# Patient Record
Sex: MALE | Race: OTHER | HISPANIC OR LATINO | Employment: FULL TIME | URBAN - METROPOLITAN AREA
[De-identification: names, ages, dates, MRNs, and addresses within clinical notes are randomized per-mention and may not be internally consistent; named-entity substitution may affect disease eponyms.]

---

## 2017-03-12 ENCOUNTER — HOSPITAL ENCOUNTER (EMERGENCY)
Facility: HOSPITAL | Age: 22
Discharge: HOME | End: 2017-03-12
Attending: EMERGENCY MEDICINE
Payer: COMMERCIAL

## 2017-03-12 ENCOUNTER — APPOINTMENT (EMERGENCY)
Dept: RADIOLOGY | Facility: HOSPITAL | Age: 22
End: 2017-03-12
Payer: COMMERCIAL

## 2017-03-12 VITALS
HEART RATE: 65 BPM | TEMPERATURE: 95.8 F | WEIGHT: 300 LBS | RESPIRATION RATE: 18 BRPM | DIASTOLIC BLOOD PRESSURE: 77 MMHG | SYSTOLIC BLOOD PRESSURE: 135 MMHG | OXYGEN SATURATION: 99 %

## 2017-03-12 DIAGNOSIS — S92.352A FRACTURE OF FIFTH METATARSAL BONE OF LEFT FOOT: Primary | ICD-10-CM

## 2017-03-12 PROCEDURE — 73630 X-RAY EXAM OF FOOT: CPT

## 2017-03-12 PROCEDURE — 99283 EMERGENCY DEPT VISIT LOW MDM: CPT

## 2017-03-12 RX ORDER — OXYCODONE HYDROCHLORIDE AND ACETAMINOPHEN 5; 325 MG/1; MG/1
1 TABLET ORAL EVERY 4 HOURS PRN
Qty: 20 TABLET | Refills: 0 | Status: SHIPPED | OUTPATIENT
Start: 2017-03-12 | End: 2017-03-19

## 2017-03-15 ENCOUNTER — ALLSCRIPTS OFFICE VISIT (OUTPATIENT)
Dept: OTHER | Facility: OTHER | Age: 22
End: 2017-03-15

## 2017-03-22 ENCOUNTER — HOSPITAL ENCOUNTER (OUTPATIENT)
Dept: RADIOLOGY | Facility: CLINIC | Age: 22
Discharge: HOME | End: 2017-03-22
Payer: COMMERCIAL

## 2017-03-22 ENCOUNTER — ALLSCRIPTS OFFICE VISIT (OUTPATIENT)
Dept: OTHER | Facility: OTHER | Age: 22
End: 2017-03-22

## 2017-03-22 DIAGNOSIS — S92.352A CLOSED DISPLACED FRACTURE OF FIFTH METATARSAL BONE OF LEFT FOOT: ICD-10-CM

## 2017-03-22 PROCEDURE — 73630 X-RAY EXAM OF FOOT: CPT

## 2017-04-12 ENCOUNTER — HOSPITAL ENCOUNTER (OUTPATIENT)
Dept: RADIOLOGY | Facility: CLINIC | Age: 22
Discharge: HOME | End: 2017-04-12
Payer: COMMERCIAL

## 2017-04-12 ENCOUNTER — ALLSCRIPTS OFFICE VISIT (OUTPATIENT)
Dept: OTHER | Facility: OTHER | Age: 22
End: 2017-04-12

## 2017-04-12 DIAGNOSIS — S92.352A CLOSED DISPLACED FRACTURE OF FIFTH METATARSAL BONE OF LEFT FOOT: ICD-10-CM

## 2017-04-12 PROCEDURE — 73630 X-RAY EXAM OF FOOT: CPT

## 2017-04-25 DIAGNOSIS — S92.352A CLOSED DISPLACED FRACTURE OF FIFTH METATARSAL BONE OF LEFT FOOT: ICD-10-CM

## 2017-05-30 DIAGNOSIS — S92.352A CLOSED DISPLACED FRACTURE OF FIFTH METATARSAL BONE OF LEFT FOOT: ICD-10-CM

## 2018-01-13 VITALS
SYSTOLIC BLOOD PRESSURE: 128 MMHG | DIASTOLIC BLOOD PRESSURE: 79 MMHG | BODY MASS INDEX: 39.76 KG/M2 | HEIGHT: 73 IN | WEIGHT: 300 LBS

## 2018-02-15 ENCOUNTER — APPOINTMENT (EMERGENCY)
Dept: RADIOLOGY | Facility: HOSPITAL | Age: 23
End: 2018-02-15
Payer: COMMERCIAL

## 2018-02-15 ENCOUNTER — HOSPITAL ENCOUNTER (EMERGENCY)
Facility: HOSPITAL | Age: 23
Discharge: HOME | End: 2018-02-15
Attending: EMERGENCY MEDICINE | Admitting: EMERGENCY MEDICINE
Payer: COMMERCIAL

## 2018-02-15 VITALS
OXYGEN SATURATION: 97 % | DIASTOLIC BLOOD PRESSURE: 71 MMHG | SYSTOLIC BLOOD PRESSURE: 155 MMHG | BODY MASS INDEX: 40.24 KG/M2 | WEIGHT: 305 LBS | TEMPERATURE: 98 F | HEART RATE: 92 BPM | RESPIRATION RATE: 18 BRPM

## 2018-02-15 DIAGNOSIS — K52.9 GASTROENTERITIS: Primary | ICD-10-CM

## 2018-02-15 LAB
ALBUMIN SERPL BCP-MCNC: 4.1 G/DL (ref 3.5–5)
ALP SERPL-CCNC: 82 U/L (ref 46–116)
ALT SERPL W P-5'-P-CCNC: 49 U/L (ref 12–78)
ANION GAP SERPL CALCULATED.3IONS-SCNC: 3 MMOL/L (ref 4–13)
APTT PPP: 26 SECONDS (ref 24–33)
AST SERPL W P-5'-P-CCNC: 24 U/L (ref 5–45)
BILIRUB SERPL-MCNC: 1 MG/DL (ref 0.2–1)
BUN SERPL-MCNC: 12 MG/DL (ref 5–25)
CALCIUM SERPL-MCNC: 8.8 MG/DL (ref 8.3–10.1)
CHLORIDE SERPL-SCNC: 100 MMOL/L (ref 100–108)
CO2 SERPL-SCNC: 34 MMOL/L (ref 21–32)
CREAT SERPL-MCNC: 1.1 MG/DL (ref 0.6–1.3)
ERYTHROCYTE [DISTWIDTH] IN BLOOD BY AUTOMATED COUNT: 13.1 % (ref 11.6–15.1)
GFR SERPL CREATININE-BSD FRML MDRD: 95 ML/MIN/1.73SQ M
GLUCOSE SERPL-MCNC: 107 MG/DL (ref 65–140)
HCT VFR BLD AUTO: 51 % (ref 42–52)
HGB BLD-MCNC: 17.1 G/DL (ref 14–18)
INR PPP: 0.99 (ref 0.86–1.16)
LG PLATELETS BLD QL SMEAR: PRESENT
LIPASE SERPL-CCNC: 86 U/L (ref 73–393)
LYMPHOCYTES # BLD AUTO: 0.31 THOUSAND/UL (ref 0.6–4.47)
LYMPHOCYTES # BLD AUTO: 3 %
MAGNESIUM SERPL-MCNC: 1.7 MG/DL (ref 1.6–2.6)
MCH RBC QN AUTO: 27.8 PG (ref 27–31)
MCHC RBC AUTO-ENTMCNC: 33.6 G/DL (ref 31.4–37.4)
MCV RBC AUTO: 83 FL (ref 82–98)
MONOCYTES # BLD AUTO: 0.62 THOUSAND/UL (ref 0–1.22)
MONOCYTES NFR BLD AUTO: 6 % (ref 4–12)
NEUTS BAND NFR BLD MANUAL: 15 % (ref 0–8)
NEUTS SEG # BLD: 9.46 THOUSAND/UL (ref 1.81–6.82)
NEUTS SEG NFR BLD AUTO: 76 %
NRBC BLD AUTO-RTO: 0 /100 WBCS
PLATELET # BLD AUTO: 177 THOUSANDS/UL (ref 130–400)
PLATELET BLD QL SMEAR: ADEQUATE
PMV BLD AUTO: 8.6 FL (ref 8.9–12.7)
POTASSIUM SERPL-SCNC: 4.1 MMOL/L (ref 3.5–5.3)
PROT SERPL-MCNC: 7.4 G/DL (ref 6.4–8.2)
PROTHROMBIN TIME: 10.4 SECONDS (ref 9.4–11.7)
RBC # BLD AUTO: 6.17 MILLION/UL (ref 4.7–6.1)
SODIUM SERPL-SCNC: 137 MMOL/L (ref 136–145)
TOTAL CELLS COUNTED SPEC: 100
WBC # BLD AUTO: 10.4 THOUSAND/UL (ref 4.8–10.8)

## 2018-02-15 PROCEDURE — C9113 INJ PANTOPRAZOLE SODIUM, VIA: HCPCS | Performed by: EMERGENCY MEDICINE

## 2018-02-15 PROCEDURE — 96374 THER/PROPH/DIAG INJ IV PUSH: CPT

## 2018-02-15 PROCEDURE — 83690 ASSAY OF LIPASE: CPT | Performed by: EMERGENCY MEDICINE

## 2018-02-15 PROCEDURE — 80053 COMPREHEN METABOLIC PANEL: CPT | Performed by: EMERGENCY MEDICINE

## 2018-02-15 PROCEDURE — 85610 PROTHROMBIN TIME: CPT | Performed by: EMERGENCY MEDICINE

## 2018-02-15 PROCEDURE — 96375 TX/PRO/DX INJ NEW DRUG ADDON: CPT

## 2018-02-15 PROCEDURE — 85007 BL SMEAR W/DIFF WBC COUNT: CPT | Performed by: EMERGENCY MEDICINE

## 2018-02-15 PROCEDURE — 85027 COMPLETE CBC AUTOMATED: CPT | Performed by: EMERGENCY MEDICINE

## 2018-02-15 PROCEDURE — 85730 THROMBOPLASTIN TIME PARTIAL: CPT | Performed by: EMERGENCY MEDICINE

## 2018-02-15 PROCEDURE — 83735 ASSAY OF MAGNESIUM: CPT | Performed by: EMERGENCY MEDICINE

## 2018-02-15 PROCEDURE — 36415 COLL VENOUS BLD VENIPUNCTURE: CPT | Performed by: EMERGENCY MEDICINE

## 2018-02-15 PROCEDURE — 96361 HYDRATE IV INFUSION ADD-ON: CPT

## 2018-02-15 PROCEDURE — 99284 EMERGENCY DEPT VISIT MOD MDM: CPT

## 2018-02-15 PROCEDURE — 74177 CT ABD & PELVIS W/CONTRAST: CPT

## 2018-02-15 RX ORDER — MORPHINE SULFATE 4 MG/ML
4 INJECTION, SOLUTION INTRAMUSCULAR; INTRAVENOUS ONCE
Status: COMPLETED | OUTPATIENT
Start: 2018-02-15 | End: 2018-02-15

## 2018-02-15 RX ORDER — ONDANSETRON 2 MG/ML
4 INJECTION INTRAMUSCULAR; INTRAVENOUS ONCE
Status: COMPLETED | OUTPATIENT
Start: 2018-02-15 | End: 2018-02-15

## 2018-02-15 RX ORDER — PANTOPRAZOLE SODIUM 40 MG/1
40 INJECTION, POWDER, FOR SOLUTION INTRAVENOUS ONCE
Status: COMPLETED | OUTPATIENT
Start: 2018-02-15 | End: 2018-02-15

## 2018-02-15 RX ORDER — FAMOTIDINE 20 MG/1
20 TABLET, FILM COATED ORAL 2 TIMES DAILY
Qty: 30 TABLET | Refills: 0 | Status: SHIPPED | OUTPATIENT
Start: 2018-02-15

## 2018-02-15 RX ORDER — ONDANSETRON 4 MG/1
4 TABLET, FILM COATED ORAL EVERY 6 HOURS
Qty: 9 TABLET | Refills: 0 | Status: SHIPPED | OUTPATIENT
Start: 2018-02-15 | End: 2018-02-18

## 2018-02-15 RX ADMIN — ONDANSETRON 4 MG: 2 INJECTION INTRAMUSCULAR; INTRAVENOUS at 14:12

## 2018-02-15 RX ADMIN — IOHEXOL 100 ML: 350 INJECTION, SOLUTION INTRAVENOUS at 15:02

## 2018-02-15 RX ADMIN — PANTOPRAZOLE SODIUM 40 MG: 40 INJECTION, POWDER, FOR SOLUTION INTRAVENOUS at 14:13

## 2018-02-15 RX ADMIN — SODIUM CHLORIDE 1000 ML: 0.9 INJECTION, SOLUTION INTRAVENOUS at 14:09

## 2018-02-15 RX ADMIN — MORPHINE SULFATE 4 MG: 4 INJECTION, SOLUTION INTRAMUSCULAR; INTRAVENOUS at 14:13

## 2018-02-15 NOTE — ED PROVIDER NOTES
History  Chief Complaint   Patient presents with    Vomiting     vomiting since this am, feels weak      72-year-old male presents with diffuse abdominal pain, nausea vomiting and nonbloody diarrhea for the past 12 hours  He reports he had some medium well done steak last night  Denies any fevers chills chest pain shortness of breath or any other symptoms  No travel history  No recent antibiotics  The pain is continuous sharp currently a 9/10 nothing makes it better or worse  None       History reviewed  No pertinent past medical history  History reviewed  No pertinent surgical history  History reviewed  No pertinent family history  I have reviewed and agree with the history as documented  Social History   Substance Use Topics    Smoking status: Current Some Day Smoker    Smokeless tobacco: Never Used    Alcohol use No        Review of Systems   All other systems reviewed and are negative  Physical Exam  ED Triage Vitals [02/15/18 1350]   Temperature Pulse Respirations Blood Pressure SpO2   98 °F (36 7 °C) 92 18 155/71 97 %      Temp Source Heart Rate Source Patient Position - Orthostatic VS BP Location FiO2 (%)   Tympanic Monitor Sitting Right arm --      Pain Score       8           Orthostatic Vital Signs  Vitals:    02/15/18 1350 02/15/18 1400   BP: 155/71 155/71   Pulse: 92    Patient Position - Orthostatic VS: Sitting        Physical Exam   Constitutional: He is oriented to person, place, and time  He appears well-developed and well-nourished  HENT:   Head: Normocephalic and atraumatic  Eyes: EOM are normal  Pupils are equal, round, and reactive to light  Neck: Normal range of motion  Neck supple  Cardiovascular: Normal rate and regular rhythm  Pulmonary/Chest: Effort normal and breath sounds normal    Abdominal: Soft  Bowel sounds are normal  He exhibits no distension and no mass  There is tenderness  There is no rebound and no guarding  No hernia  Musculoskeletal: Normal range of motion  Neurological: He is alert and oriented to person, place, and time  Skin: Skin is warm and dry  Psychiatric: He has a normal mood and affect  Nursing note and vitals reviewed  ED Medications  Medications   sodium chloride 0 9 % bolus 1,000 mL (0 mL Intravenous Stopped 2/15/18 1549)   morphine (PF) 4 mg/mL injection 4 mg (4 mg Intravenous Given 2/15/18 1413)   ondansetron (ZOFRAN) injection 4 mg (4 mg Intravenous Given 2/15/18 1412)   pantoprazole (PROTONIX) injection 40 mg (40 mg Intravenous Given 2/15/18 1413)   iohexol (OMNIPAQUE) 350 MG/ML injection (MULTI-DOSE) 100 mL (100 mL Intravenous Given 2/15/18 1502)       Diagnostic Studies  Results Reviewed     Procedure Component Value Units Date/Time    CBC and differential [[id number]]  (Abnormal) Collected:  02/15/18 1413    Lab Status:  Final result Specimen:  Blood from Arm, Left Updated:  02/15/18 1452     WBC 10 40 Thousand/uL      RBC 6 17 (H) Million/uL      Hemoglobin 17 1 g/dL      Hematocrit 51 0 %      MCV 83 fL      MCH 27 8 pg      MCHC 33 6 g/dL      RDW 13 1 %      MPV 8 6 (L) fL      Platelets 316 Thousands/uL      nRBC 0 /100 WBCs     Protime-INR [[id number]]  (Normal) Collected:  02/15/18 1413    Lab Status:  Final result Specimen:  Blood from Arm, Left Updated:  02/15/18 1447     Protime 10 4 seconds      INR 0 99    APTT [[id number]]  (Normal) Collected:  02/15/18 1413    Lab Status:  Final result Specimen:  Blood from Arm, Left Updated:  02/15/18 1447     PTT 26 seconds     Narrative:          Therapeutic Heparin Range = 60-90 seconds    Comprehensive metabolic panel [[id number]]  (Abnormal) Collected:  02/15/18 1413    Lab Status:  Final result Specimen:  Blood from Arm, Left Updated:  02/15/18 1438     Sodium 137 mmol/L      Potassium 4 1 mmol/L      Chloride 100 mmol/L      CO2 34 (H) mmol/L      Anion Gap 3 (L) mmol/L      BUN 12 mg/dL      Creatinine 1 10 mg/dL      Glucose 107 mg/dL      Calcium 8 8 mg/dL      AST 24 U/L      ALT 49 U/L      Alkaline Phosphatase 82 U/L      Total Protein 7 4 g/dL      Albumin 4 1 g/dL      Total Bilirubin 1 00 mg/dL      eGFR 95 ml/min/1 73sq m     Narrative:         National Kidney Disease Education Program recommendations are as follows:  GFR calculation is accurate only with a steady state creatinine  Chronic Kidney disease less than 60 ml/min/1 73 sq  meters  Kidney failure less than 15 ml/min/1 73 sq  meters  Lipase [[id number]]  (Normal) Collected:  02/15/18 1413    Lab Status:  Final result Specimen:  Blood from Arm, Left Updated:  02/15/18 1438     Lipase 86 u/L     Magnesium [[id number]]  (Normal) Collected:  02/15/18 1413    Lab Status:  Final result Specimen:  Blood from Arm, Left Updated:  02/15/18 1438     Magnesium 1 7 mg/dL                  CT abdomen pelvis with contrast   Final Result by Jagruti Norman MD (02/15 1514)      No acute CT findings  Workstation performed: [id number]                    Procedures  Procedures       Phone Contacts  ED Phone Contact    ED Course  ED Course                                MDM  Number of Diagnoses or Management Options  Gastroenteritis:   Diagnosis management comments: Patient evaluated with labs and CT scan of the abdomen and pelvis which did not show any acute etiology  Patient was given IV fluids pain and nausea medicines in the ED which improved his symptoms  He was discharged with the Pepcid and Zofran  Recommended he follow up with his family doctor and to return for any worsening symptoms   He verbalized understanding of instructions had no further questions at time of discharge       Amount and/or Complexity of Data Reviewed  Clinical lab tests: ordered and reviewed  Tests in the radiology section of CPT®: ordered and reviewed  Tests in the medicine section of CPT®: ordered and reviewed    Patient Progress  Patient progress: stable    CritCare Time    Disposition  Final diagnoses:   Gastroenteritis Time reflects when diagnosis was documented in both MDM as applicable and the Disposition within this note     Time User Action Codes Description Comment    2/15/2018  3:46 PM Dmitry Patrick Add [K52 9] Gastroenteritis       ED Disposition     ED Disposition Condition Comment    Discharge  Rezajorge a Calvert discharge to home/self care  Condition at discharge: Stable        Follow-up Information     Follow up With Specialties Details [facility] [address] Emergency Department Emergency Medicine  If symptoms worsen [address] [facility], [facility] [address]        Discharge Medication List as of 2/15/2018  3:47 PM      START taking these medications    Details   famotidine (PEPCID) 20 mg tablet Take 1 tablet (20 mg total) by mouth 2 (two) times a day, Starting Thu 2/15/2018, Print      ondansetron (ZOFRAN) 4 mg tablet Take 1 tablet (4 mg total) by mouth every 6 (six) hours for 3 days, Starting Thu 2/15/2018, Until Sun 2/18/2018, Print           No discharge procedures on file      ED Provider  Electronically Signed by           Elayne Corona DO  02/16/18 6322

## 2018-02-15 NOTE — DISCHARGE INSTRUCTIONS
Enteritis   WHAT YOU NEED TO KNOW:   Enteritis is inflammation of the small intestine  It may be caused by eating foods or drinking liquids contaminated with a virus, bacteria, or parasites  It may also be caused by certain medicines, damage from radiation, and medical conditions such as Crohn disease  DISCHARGE INSTRUCTIONS:   Return to the emergency department if:   · You cannot stop vomiting  · You have not urinated for 12 hours  Contact your healthcare provider if:   · You have a fever over 101 5  · You have blood or mucus in your bowel movements  · You continue to vomit or have diarrhea for more than 3 days, even after treatment  · You have a dry mouth and eyes, you are urinating less than usual, and you feel dizzy when you stand up  · Your mouth or eyes are dry  You are not urinating as much or as often  · You are losing weight without trying  · You have questions or concerns about your condition or care  Medicines:   · Medicines  may be given to fight an infection caused by bacteria or a parasite  You may also need medicines to slow or stop your diarrhea or vomiting  Do not take these medicines unless your healthcare provider say it is okay  Other medicines may be needed to treat medical conditions that are causing enteritis  · Take your medicine as directed  Contact your healthcare provider if you think your medicine is not helping or if you have side effects  Tell him of her if you are allergic to any medicine  Keep a list of the medicines, vitamins, and herbs you take  Include the amounts, and when and why you take them  Bring the list or the pill bottles to follow-up visits  Carry your medicine list with you in case of an emergency  Manage enteritis:   · Eat foods that help to decrease symptoms  Limit or avoid foods and liquids that are high in sugar, fat, and fiber to help relieve diarrhea  It may be helpful to avoid lactose   Lactose is a type of sugar that is found in milk products  You may be able to tolerate soups, broths, well-cooked vegetables, canned fruit, and baked or broiled meats  Ask your dietitian or healthcare provider if you should follow a special diet  You may need to avoid other foods if you have certain medical conditions such as celiac disease  · Drink liquids as directed  Ask how much liquid to drink each day and which liquids are best for you  It is important to prevent or treat dehydration  Even if you have been vomiting, suck on ice chips or take small sips of clear liquids often  Slowly increase the amount of clear liquids you drink  If you become dehydrated, you may need IV liquids  · Drink an oral rehydration solution (ORS) as directed  An ORS contains water, salts, and sugar that are needed to replace lost body fluids  Ask what kind of ORS to use, how much to drink, and where to get it  Prevent enteritis:  Enteritis that is caused by bacteria, parasites, or viruses can be prevented  The following may help to prevent this type of enteritis:  · Wash your hands often  Use soap and water  Wash your hands after you use the bathroom, change a child's diapers, or sneeze  Wash your hands before you prepare or eat food  · Clean surfaces and do laundry often  Wash your clothes and towels separately from the rest of the laundry  Clean surfaces in your home with antibacterial  or bleach  · Clean food thoroughly and cook safely  Wash raw vegetables before you cook  Cook meat, fish, and eggs fully  Do not use the same dishes for raw meat as you do for other foods  Refrigerate any leftover food immediately  · Be aware when you camp or travel  Drink only clean water  Do not drink from rivers or lakes unless you purify or boil the water first  When you travel, drink bottled water and do not add ice  Do not eat fruit that has not been peeled  Do not eat raw fish or meat that is not fully cooked    Follow up with your healthcare provider as directed:  Write down your questions so you remember to ask them during your visits  © 2017 [employer] Information is for End User's use only and may not be sold, redistributed or otherwise used for commercial purposes  All illustrations and images included in CareNotes® are the copyrighted property of [employer]  or Oliver Diaz  The above information is an  only  It is not intended as medical advice for individual conditions or treatments  Talk to your doctor, nurse or pharmacist before following any medical regimen to see if it is safe and effective for you

## 2019-07-24 ENCOUNTER — HOSPITAL ENCOUNTER (EMERGENCY)
Facility: HOSPITAL | Age: 24
Discharge: HOME | End: 2019-07-25
Attending: EMERGENCY MEDICINE
Payer: COMMERCIAL

## 2019-07-24 DIAGNOSIS — R10.9 ABDOMINAL PAIN: ICD-10-CM

## 2019-07-24 DIAGNOSIS — R11.2 NAUSEA AND VOMITING: Primary | ICD-10-CM

## 2019-07-24 DIAGNOSIS — K52.9 GASTROENTERITIS: ICD-10-CM

## 2019-07-24 LAB
ALBUMIN SERPL BCP-MCNC: 4.3 G/DL (ref 3.5–5)
ALP SERPL-CCNC: 80 U/L (ref 46–116)
ALT SERPL W P-5'-P-CCNC: 63 U/L (ref 12–78)
ANION GAP SERPL CALCULATED.3IONS-SCNC: 6 MMOL/L (ref 4–13)
AST SERPL W P-5'-P-CCNC: 20 U/L (ref 5–45)
BASOPHILS # BLD AUTO: 0.03 THOUSANDS/ΜL (ref 0–0.1)
BASOPHILS NFR BLD AUTO: 0 % (ref 0–1)
BILIRUB SERPL-MCNC: 0.8 MG/DL (ref 0.2–1)
BUN SERPL-MCNC: 9 MG/DL (ref 5–25)
CALCIUM SERPL-MCNC: 8.9 MG/DL (ref 8.3–10.1)
CHLORIDE SERPL-SCNC: 100 MMOL/L (ref 100–108)
CO2 SERPL-SCNC: 31 MMOL/L (ref 21–32)
CREAT SERPL-MCNC: 1.25 MG/DL (ref 0.6–1.3)
EOSINOPHIL # BLD AUTO: 0.16 THOUSAND/ΜL (ref 0–0.61)
EOSINOPHIL NFR BLD AUTO: 2 % (ref 0–6)
ERYTHROCYTE [DISTWIDTH] IN BLOOD BY AUTOMATED COUNT: 12.3 % (ref 11.6–15.1)
GFR SERPL CREATININE-BSD FRML MDRD: 81 ML/MIN/1.73SQ M
GLUCOSE SERPL-MCNC: 97 MG/DL (ref 65–140)
HCT VFR BLD AUTO: 50.9 % (ref 36.5–49.3)
HGB BLD-MCNC: 17.2 G/DL (ref 12–17)
IMM GRANULOCYTES # BLD AUTO: 0.04 THOUSAND/UL (ref 0–0.2)
IMM GRANULOCYTES NFR BLD AUTO: 0 % (ref 0–2)
LIPASE SERPL-CCNC: 88 U/L (ref 73–393)
LYMPHOCYTES # BLD AUTO: 2.2 THOUSANDS/ΜL (ref 0.6–4.47)
LYMPHOCYTES NFR BLD AUTO: 24 % (ref 14–44)
MCH RBC QN AUTO: 28.2 PG (ref 26.8–34.3)
MCHC RBC AUTO-ENTMCNC: 33.8 G/DL (ref 31.4–37.4)
MCV RBC AUTO: 83 FL (ref 82–98)
MONOCYTES # BLD AUTO: 0.75 THOUSAND/ΜL (ref 0.17–1.22)
MONOCYTES NFR BLD AUTO: 8 % (ref 4–12)
NEUTROPHILS # BLD AUTO: 6.14 THOUSANDS/ΜL (ref 1.85–7.62)
NEUTS SEG NFR BLD AUTO: 66 % (ref 43–75)
NRBC BLD AUTO-RTO: 0 /100 WBCS
PLATELET # BLD AUTO: 226 THOUSANDS/UL (ref 149–390)
PMV BLD AUTO: 9.9 FL (ref 8.9–12.7)
POTASSIUM SERPL-SCNC: 3.9 MMOL/L (ref 3.5–5.3)
PROT SERPL-MCNC: 7.8 G/DL (ref 6.4–8.2)
RBC # BLD AUTO: 6.11 MILLION/UL (ref 3.88–5.62)
SODIUM SERPL-SCNC: 137 MMOL/L (ref 136–145)
WBC # BLD AUTO: 9.32 THOUSAND/UL (ref 4.31–10.16)

## 2019-07-24 PROCEDURE — 80053 COMPREHEN METABOLIC PANEL: CPT | Performed by: EMERGENCY MEDICINE

## 2019-07-24 PROCEDURE — 99284 EMERGENCY DEPT VISIT MOD MDM: CPT

## 2019-07-24 PROCEDURE — 96361 HYDRATE IV INFUSION ADD-ON: CPT

## 2019-07-24 PROCEDURE — 83690 ASSAY OF LIPASE: CPT | Performed by: EMERGENCY MEDICINE

## 2019-07-24 PROCEDURE — 36415 COLL VENOUS BLD VENIPUNCTURE: CPT

## 2019-07-24 PROCEDURE — 96374 THER/PROPH/DIAG INJ IV PUSH: CPT

## 2019-07-24 PROCEDURE — 85025 COMPLETE CBC W/AUTO DIFF WBC: CPT | Performed by: EMERGENCY MEDICINE

## 2019-07-24 RX ORDER — ONDANSETRON 2 MG/ML
4 INJECTION INTRAMUSCULAR; INTRAVENOUS ONCE
Status: COMPLETED | OUTPATIENT
Start: 2019-07-24 | End: 2019-07-24

## 2019-07-24 RX ORDER — ONDANSETRON 4 MG/1
4 TABLET, ORALLY DISINTEGRATING ORAL EVERY 8 HOURS PRN
Qty: 20 TABLET | Refills: 0 | Status: SHIPPED | OUTPATIENT
Start: 2019-07-24

## 2019-07-24 RX ADMIN — SODIUM CHLORIDE 1000 ML: 0.9 INJECTION, SOLUTION INTRAVENOUS at 22:45

## 2019-07-24 RX ADMIN — ONDANSETRON 4 MG: 2 INJECTION INTRAMUSCULAR; INTRAVENOUS at 22:45

## 2019-07-25 VITALS
HEIGHT: 73 IN | BODY MASS INDEX: 41.75 KG/M2 | HEART RATE: 74 BPM | RESPIRATION RATE: 16 BRPM | TEMPERATURE: 97.1 F | DIASTOLIC BLOOD PRESSURE: 88 MMHG | OXYGEN SATURATION: 98 % | SYSTOLIC BLOOD PRESSURE: 159 MMHG | WEIGHT: 315 LBS

## 2019-07-25 NOTE — ED PROVIDER NOTES
History  Chief Complaint   Patient presents with    Vomiting     Patient states he is unable to keep anything down  Patient states, "I have thrown up more than 10 times today "    Abdominal Pain     67-year-old male presents for evaluation of nausea vomiting abdominal pain  Patient states he has been on a drug binge for the weekend  No SI, no HI, does not need detox  No fever  States he hasn't really eaten in the last 5 days  No history of diabetes, denies any drugs or alcohol on last 24 hours  History provided by:  Patient  Vomiting   Severity:  Moderate  Duration:  1 day  Timing:  Intermittent  Quality:  Stomach contents  Progression:  Unchanged  Chronicity:  New  Recent urination:  Normal  Relieved by:  None tried  Ineffective treatments:  None tried  Associated symptoms: abdominal pain    Risk factors: no alcohol use, no diabetes, no sick contacts, no suspect food intake and no travel to endemic areas    Abdominal Pain   Associated symptoms: vomiting        Prior to Admission Medications   Prescriptions Last Dose Informant Patient Reported? Taking?   famotidine (PEPCID) 20 mg tablet Not Taking at Unknown time  No No   Sig: Take 1 tablet (20 mg total) by mouth 2 (two) times a day   Patient not taking: Reported on 7/24/2019   ondansetron (ZOFRAN) 4 mg tablet   No No   Sig: Take 1 tablet (4 mg total) by mouth every 6 (six) hours for 3 days      Facility-Administered Medications: None       History reviewed  No pertinent past medical history  History reviewed  No pertinent surgical history  History reviewed  No pertinent family history  I have reviewed and agree with the history as documented  Social History     Tobacco Use    Smoking status: Current Some Day Smoker     Types: Cigarettes    Smokeless tobacco: Never Used   Substance Use Topics    Alcohol use:  Yes     Alcohol/week: 5 0 standard drinks     Types: 2 Shots of liquor, 3 Cans of beer per week    Drug use: Yes     Types: Marijuana, Cocaine     Comment: Acid, ectasy        Review of Systems   Constitutional: Negative  HENT: Negative  Respiratory: Negative  Cardiovascular: Negative  Gastrointestinal: Positive for abdominal pain and vomiting  Genitourinary: Negative  Musculoskeletal: Negative  Skin: Negative  Neurological: Negative  Hematological: Negative  Psychiatric/Behavioral: Negative  All other systems reviewed and are negative  Physical Exam  Physical Exam   Constitutional: He is oriented to person, place, and time  He appears well-developed and well-nourished  HENT:   Head: Normocephalic and atraumatic  Mouth/Throat: Oropharynx is clear and moist    Eyes: Pupils are equal, round, and reactive to light  EOM are normal    Cardiovascular: Normal rate, regular rhythm, normal heart sounds and intact distal pulses  Pulmonary/Chest: Effort normal and breath sounds normal    Abdominal: Soft  Normal appearance, normal aorta and bowel sounds are normal  There is generalized tenderness  Neurological: He is alert and oriented to person, place, and time  Skin: Skin is warm and dry  Capillary refill takes less than 2 seconds  Psychiatric: He has a normal mood and affect  His behavior is normal    Nursing note and vitals reviewed        Vital Signs  ED Triage Vitals [07/24/19 2227]   Temperature Pulse Respirations Blood Pressure SpO2   (!) 97 1 °F (36 2 °C) 85 18 (!) 174/91 100 %      Temp Source Heart Rate Source Patient Position - Orthostatic VS BP Location FiO2 (%)   Tympanic Monitor Sitting Right arm --      Pain Score       8           Vitals:    07/24/19 2227   BP: (!) 174/91   Pulse: 85   Patient Position - Orthostatic VS: Sitting         Visual Acuity      ED Medications  Medications   sodium chloride 0 9 % bolus 1,000 mL (1,000 mL Intravenous New Bag 7/24/19 2245)   ondansetron (ZOFRAN) injection 4 mg (4 mg Intravenous Given 7/24/19 2245)       Diagnostic Studies  Results Reviewed     Procedure Component Value Units Date/Time    Comprehensive metabolic panel [[id number]] Collected:  07/24/19 2245    Lab Status:  Final result Specimen:  Blood from Arm, Left Updated:  07/24/19 2306     Sodium 137 mmol/L      Potassium 3 9 mmol/L      Chloride 100 mmol/L      CO2 31 mmol/L      ANION GAP 6 mmol/L      BUN 9 mg/dL      Creatinine 1 25 mg/dL      Glucose 97 mg/dL      Calcium 8 9 mg/dL      AST 20 U/L      ALT 63 U/L      Alkaline Phosphatase 80 U/L      Total Protein 7 8 g/dL      Albumin 4 3 g/dL      Total Bilirubin 0 80 mg/dL      eGFR 81 ml/min/1 73sq m     Narrative:       National Kidney Disease Foundation guidelines for Chronic Kidney Disease (CKD):     Stage 1 with normal or high GFR (GFR > 90 mL/min/1 73 square meters)    Stage 2 Mild CKD (GFR = 60-89 mL/min/1 73 square meters)    Stage 3A Moderate CKD (GFR = 45-59 mL/min/1 73 square meters)    Stage 3B Moderate CKD (GFR = 30-44 mL/min/1 73 square meters)    Stage 4 Severe CKD (GFR = 15-29 mL/min/1 73 square meters)    Stage 5 End Stage CKD (GFR <15 mL/min/1 73 square meters)  Note: GFR calculation is accurate only with a steady state creatinine    Lipase [[id number]]  (Normal) Collected:  07/24/19 2245    Lab Status:  Final result Specimen:  Blood from Arm, Left Updated:  07/24/19 2306     Lipase 88 u/L     CBC and differential [[id number]]  (Abnormal) Collected:  07/24/19 2245    Lab Status:  Final result Specimen:  Blood from Arm, Left Updated:  07/24/19 2250     WBC 9 32 Thousand/uL      RBC 6 11 Million/uL      Hemoglobin 17 2 g/dL      Hematocrit 50 9 %      MCV 83 fL      MCH 28 2 pg      MCHC 33 8 g/dL      RDW 12 3 %      MPV 9 9 fL      Platelets 811 Thousands/uL      nRBC 0 /100 WBCs      Neutrophils Relative 66 %      Immat GRANS % 0 %      Lymphocytes Relative 24 %      Monocytes Relative 8 %      Eosinophils Relative 2 %      Basophils Relative 0 %      Neutrophils Absolute 6 14 Thousands/µL      Immature Grans Absolute 0 04 Thousand/uL      Lymphocytes Absolute 2 20 Thousands/µL      Monocytes Absolute 0 75 Thousand/µL      Eosinophils Absolute 0 16 Thousand/µL      Basophils Absolute 0 03 Thousands/µL     UA w Reflex to Microscopic w Reflex to Culture [[id number]]     Lab Status:  No result Specimen:  Urine                  No orders to display              Procedures  Procedures       ED Course                               MDM    Disposition  Final diagnoses:   None     ED Disposition     None      Follow-up Information    None         Patient's Medications   Discharge Prescriptions    No medications on file     No discharge procedures on file      ED Provider  Electronically Signed by           Kriss Hewitt MD  07/25/19 0061